# Patient Record
(demographics unavailable — no encounter records)

---

## 2024-10-25 NOTE — PHYSICAL EXAM
[Normal Appearance] : normal appearance [Edema] : no peripheral edema [] : no respiratory distress [Bowel Sounds] : normal bowel sounds [Abdomen Tenderness] : non-tender [Urethral Meatus] : meatus normal [Epididymis] : the epididymides were normal [Testes Tenderness] : no tenderness of the testes [Testes Mass (___cm)] : there were no testicular masses [Chaperone Present] : A chaperone was present in the examining room during all aspects of the physical examination [FreeTextEntry2] : Kiley MAYNARD

## 2024-10-25 NOTE — HISTORY OF PRESENT ILLNESS
[FreeTextEntry1] : SOPHIA NUNEZ is a 63 year M who presents today as a follow-up patient evaluation for elevated PSA  Father with prostate cancer MRI 2017 - PIRADS 2, 30cc prostate  PSA 5/2024 - 18.90 PSA 11/2023 - 21.30 PSA 6/2023 - 22.40 PSA 5/2017 - 7.94 PSA 10/2016 - 7.30 PSA 5/2014 - 3.80  MRI 8/2023 - 40cc prostate, PIRADS 3 MRI 6/2024 - 43cc prostate, PIRADS 2 TP fusion biopsy 10/2023 - negative  For the past 3 weeks, has been experiencing low back pain with radiation to b/l legs. Was so bad - could not get out of bed for several days.   For the past 3-4 months, he has also been experiencing some dull achiness behind the testicles. No dysuria, gross hematuria. No changes to his urination.  Still with nocturia x1-2  Stream is intermittent - sometimes good, sometime bad

## 2024-10-25 NOTE — ASSESSMENT
[FreeTextEntry1] : 63-year-old male with:  #Elevated PSA.  He status post MRI with PI-RADS 3 lesion, and now transperineal prostate biopsy all cores negative for malignancy. - s/p MRI x 2, most recently 6-2024 - PIRADS 2 - s/p TP fusion biopsy 10/2023 - negative - Prior MRI w/ inflammation of PZ - will empirically start bactrim x 2 weeks especially given pelvic pain for possible prostatitis  - Return in december 2024 w/ PSA Prior. IF psa remains significantly elevated, will plan for OR for saturation biopsy  #Low back pain - RBUS - Refer to orthopedic surgery

## 2025-02-20 NOTE — HISTORY OF PRESENT ILLNESS
[FreeTextEntry1] : This telephonic visit was provided via audio only technology. The patient, was located at [facility] at the time of the visit.  The provider, Jonah Mccray, was located at [place], [place] at the time of the visit. The patient and Provider participated in the telephonic visit.  Verbal consent for telephonic services was given on 2- by the patient.   The patient-doctor relationship has been established in a face to face fashion via real time video/audio HIPAA compliant communication using telemedicine software. The patient's identity has been confirmed. The patient was previously emailed a copy of the telemedicine consent. They have had a chance to review and has now given verbal consent and has requested care to be assessed and treated via telemedicine. They understand there may be limitations in this process, and that they may need further followup care in the office and/or hospital settings.   SOPHIA NUNEZ is a 63 year M who presents today as a follow-up patient evaluation for elevated PSA  Father with prostate cancer MRI 2017 - PIRADS 2, 30cc prostate  PSA 5/2024 - 18.90 PSA 11/2023 - 21.30 PSA 6/2023 - 22.40 PSA 5/2017 - 7.94 PSA 10/2016 - 7.30 PSA 5/2014 - 3.80  MRI 8/2023 - 40cc prostate, PIRADS 3 MRI 6/2024 - 43cc prostate, PIRADS 2 TP fusion biopsy 10/2023 - negative  RBUS 10/2024 - no stones, hydronephrosis. 12mm right renal cyst

## 2025-02-20 NOTE — ASSESSMENT
[FreeTextEntry1] : 63-year-old male with:  #Elevated PSA.  He status post MRI with PI-RADS 3 lesion, and now transperineal prostate biopsy all cores negative for malignancy. - s/p MRI x 2, most recently 6-2024 - PIRADS 2 - s/p TP fusion biopsy 10/2023 - negative - Prior MRI w/ inflammation of PZ - discussed possibility of prostatitis - Repeat PSA w/ free PSA. IF psa remains significantly elevated, will plan for OR for saturation biopsy  Telehealth Consultation: 20 minutes 10 minutes reviewing his history and discussing prior results. 10 minutes discussing various treatment options and writing his note.

## 2025-02-20 NOTE — HISTORY OF PRESENT ILLNESS
[FreeTextEntry1] : This telephonic visit was provided via audio only technology. The patient, was located at [facility] at the time of the visit.  The provider, Jonah Mccray, was located at [place], [place] at the time of the visit. The patient and Provider participated in the telephonic visit.  Verbal consent for telephonic services was given on 2- by the patient.   The patient-doctor relationship has been established in a face to face fashion via real time video/audio HIPAA compliant communication using telemedicine software. The patient's identity has been confirmed. The patient was previously emailed a copy of the telemedicine consent. They have had a chance to review and has now given verbal consent and has requested care to be assessed and treated via telemedicine. They understand there may be limitations in this process, and that they may need further followup care in the office and/or hospital settings.   SOPHIA NUNEZ is a 63 year M who presents today as a follow-up patient evaluation for elevated PSA  Father with prostate cancer MRI 2017 - PIRADS 2, 30cc prostate  PSA 5/2024 - 18.90 PSA 11/2023 - 21.30 PSA 6/2023 - 22.40 PSA 5/2017 - 7.94 PSA 10/2016 - 7.30 PSA 5/2014 - 3.80  MRI 8/2023 - 40cc prostate, PIRADS 3 MRI 6/2024 - 43cc prostate, PIRADS 2 TP fusion biopsy 10/2023 - negative  RBUS 10/2024 - no stones, hydronephrosis. 12mm right renal cyst

## 2025-04-27 NOTE — ASSESSMENT
[FreeTextEntry1] : 63-year-old male with:  #Elevated PSA.  He status post MRI with PI-RADS 3 lesion, and now transperineal prostate biopsy all cores negative for malignancy. - s/p MRI x 2, most recently 6-2024 - PIRADS 2 - s/p TP fusion biopsy 10/2023 - negative - Prior MRI w/ inflammation of PZ - discussed possibility of prostatitis - Repeat PSA w/ free PSA. IF psa remains significantly elevated, will plan for OR for saturation biopsy  ADDENDUM PSA 24.40, free PSA 9%. D/w patient - rising PSA, young AA man - will plan for OR saturation biopsy. Will refer to dr yates for biopsy in OR

## 2025-04-27 NOTE — PHYSICAL EXAM
[Normal Appearance] : normal appearance [Edema] : no peripheral edema [] : no respiratory distress [Bowel Sounds] : normal bowel sounds [Abdomen Tenderness] : non-tender [Urethral Meatus] : meatus normal [Epididymis] : the epididymides were normal [Testes Tenderness] : no tenderness of the testes [Testes Mass (___cm)] : there were no testicular masses [Chaperone Present] : A chaperone was present in the examining room during all aspects of the physical examination [FreeTextEntry2] : iKley MAYNARD

## 2025-04-27 NOTE — HISTORY OF PRESENT ILLNESS
[FreeTextEntry1] : SOPHIA NUNEZ is a 63 year M who presents today as a follow-up patient evaluation for elevated PSA  Father with prostate cancer MRI 2017 - PIRADS 2, 30cc prostate  PSA 5/2024 - 18.90 PSA 11/2023 - 21.30 PSA 6/2023 - 22.40 PSA 5/2017 - 7.94 PSA 10/2016 - 7.30 PSA 5/2014 - 3.80  MRI 8/2023 - 40cc prostate, PIRADS 3 MRI 6/2024 - 43cc prostate, PIRADS 2 TP fusion biopsy 10/2023 - negative  RBUS 10/2024 - no stones, hydronephrosis. 12mm right renal cyst  NO urinary symptoms - no dysuria / gross hematuria no LUTS

## 2025-07-07 NOTE — PHYSICAL EXAM

## 2025-07-07 NOTE — PHYSICAL EXAM

## 2025-07-07 NOTE — HISTORY OF PRESENT ILLNESS
[FreeTextEntry1] : This is an outpatient CV follow-up evaluation for CAD and preoperative evaluation.  61 yo man  CAD s/p MI (treated at [facility], 2009; stents - total of 3 stents) HLD HTN preDM elevated PSA  socially drinks, marijuana socially, no illicit drug use no recent surgeries or hospitalizations  July 2023 labs: CMP normal tchol 287, trig 187, HDL 66,  mg/dL TSH normal A1c 6.0%  Echo July 2023: normal study with minimal aortic/mitral regurgitation; mild LAE  At the last visit in May 2024, the following issues were discussed: Atherosclerosis of coronary artery (414.00) (I25.10) H/o MI/PCI in distant past. No symptoms. Last year's echo showed normal LVEF. Asa,     metop, statin. No need for further CV testing at this time. Hyperlipidemia (272.4) (E78.5) Statin therapy; tolerating well. Will recheck labs. Hypertension (401.9) (I10) BP stable; continue to monitor at home. Prediabetes (790.29) (R73.03) Exercise. Weight control. Recheck A1c.  Today, reports feeling well from cardiac standpoint. Denies chest pain, SOB/PRIETO, no LE edema. Reports doing exercise (treadmill, weights) without any exertional cardiac symptoms. Stopped taking aspirin and atorvastatin around 6 months ago. States that he was having easy bruising that has resolved after stopping the medications. Stopped taking metoprolol last Summer after running out of refills. Currently not taking any medications at home.   Planned for prostate biopsy with Dr. Mccray on 8/19/2025. PSA was elevated and prior biopsy was negative. PSA continues to be elevated so planned for repeat biopsy.    Occasional alcohol; occasional smoking marijuana.   ECG today: Sinus Rhythm  Mild lateral T abnormalities OTHERWISE WITHIN NORMAL LIMITS

## 2025-07-07 NOTE — HISTORY OF PRESENT ILLNESS
[FreeTextEntry1] : This is an outpatient CV follow-up evaluation for CAD and preoperative evaluation.  61 yo man  CAD s/p MI (treated at [facility], 2009; stents - total of 3 stents) HLD HTN preDM elevated PSA  socially drinks, marijuana socially, no illicit drug use no recent surgeries or hospitalizations  July 2023 labs: CMP normal tchol 287, trig 187, HDL 66,  mg/dL TSH normal A1c 6.0%  Echo July 2023: normal study with minimal aortic/mitral regurgitation; mild LAE  At the last visit in May 2024, the following issues were discussed: Atherosclerosis of coronary artery (414.00) (I25.10) H/o MI/PCI in distant past. No symptoms. Last year's echo showed normal LVEF. Asa,     metop, statin. No need for further CV testing at this time. Hyperlipidemia (272.4) (E78.5) Statin therapy; tolerating well. Will recheck labs. Hypertension (401.9) (I10) BP stable; continue to monitor at home. Prediabetes (790.29) (R73.03) Exercise. Weight control. Recheck A1c.  Today, reports feeling well from cardiac standpoint. Denies chest pain, SOB/PRIETO, no LE edema. Reports doing exercise (treadmill, weights) without any exertional cardiac symptoms. Stopped taking aspirin and atorvastatin around 6 months ago. States that he was having easy bruising that has resolved after stopping the medications. Stopped taking metoprolol last Summer after running out of refills. Currently not taking any medications at home.   Planned for prostate biopsy with Dr. Mccray on 8/19/2025. PSA was elevated and prior biopsy was negative. PSA continues to be elevated so planned for repeat biopsy.    Occasional alcohol; occasional smoking marijuana.   ECG today: Sinus Rhythm  Mild lateral T abnormalities OTHERWISE WITHIN NORMAL LIMITS